# Patient Record
Sex: FEMALE | Race: WHITE | Employment: UNEMPLOYED | URBAN - METROPOLITAN AREA
[De-identification: names, ages, dates, MRNs, and addresses within clinical notes are randomized per-mention and may not be internally consistent; named-entity substitution may affect disease eponyms.]

---

## 2020-05-06 ENCOUNTER — VIRTUAL VISIT (OUTPATIENT)
Dept: HEMATOLOGY | Age: 40
End: 2020-05-06

## 2020-05-06 DIAGNOSIS — D13.4 HEPATIC ADENOMA: ICD-10-CM

## 2020-05-06 NOTE — PROGRESS NOTES
[address] [facility], Seema MUSE, Aide Cee, MD Nora Allison, PASharonC    Juan Manuel Loaiza, [facility]     Sapphire MCCLOUD Dinora, [facility]   Izora Hashimoto, FNP-BECK Bravobernie Inez, [facility]       Jarad Deputado [address] 136    at [address], [address] [place] [address], [facility] 22.    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  ICD-9-CM: 759.89  5/16/2020        Hepatic adenoma ICD-10-CM: D13.4  ICD-9-CM: 211.5  5/16/2020    Overview Signed 5/16/2020  8:48 AM by Belgica Dougherty MD     Resected 10/2019             Hypercholesteremia ICD-10-CM: E78.00  ICD-9-CM: 272.0  5/16/2020        Depression ICD-10-CM: F32.9  ICD-9-CM: 534  5/16/2020        Hypertension ICD-10-CM: I10  ICD-9-CM: 401.9  5/16/2020        S/P DAVIDE (total abdominal hysterectomy) ICD-10-CM: Z90.710  ICD-9-CM: V88.01  5/16/2020              VIRTUAL TELEHEALTH VISIT PERFORMED DUE TO COVID-19 EPIDEMIC    CONSENT:  Adriana Nicholson, who was seen by synchronous, real-time, audio-video technology, and/or her healthcare decision maker, is aware that this patient-initiated, Telehealth encounter on 5/6/2020 is a billable service, with coverage as determined by her insurance carrier. She is aware that she may receive a bill and has provided verbal consent to proceed. This patient was evaluated during a Virtual Telehealth visit. A caregiver was present if appropriate.  Due to this being a TeleHealth encounter performed during the [address] health emergency, the physical examination was limited to that listed in the [facility].    The clinicians listed above have asked me to see Germaine sIaacs in consultation regarding elevated liver enzymes and its management. No medical records were available for review when the patient was here for the appointment. The patient is a 44 y.o. 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 She has a suster who underwent a liver transplant in [address] [place] [address], for the same disorder. She was found to have a hepatic adenoma which was surgically removed in 10/2019. Imaging of the liver was not recently performed     An assessment of liver fibrosis with biopsy or elastography has not been performed. The patient has the following symptoms which are thought to be due to the liver disease:  nausea, diarrhea,     The patient is not currently experiencing the following symptoms of liver disease:  fatigue, yellowing of the eyes or skin, itching,     The patient completes all daily activities without any functional limitations. 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 Some patients may have a very form of the disease with very slow liver injury which does not affect life expectancy. This appears to be the case in this patient. Will perform laboratory testing to monitor liver function and degree of liver injury. This included BMP, hepatic panel, CBC with platelet count,     The need to perform an assessment of liver fibrosis was discussed with the patient. The Fibroscan can assess liver fibrosis and determine if a patient has advanced fibrosis or cirrhosis without the need for liver biopsy. This will be performed at the next office visit. The Fibroscan can be repeated annually or as often as clinically indicated to assess for fibrosis progression and/or regression.     Will perform imaging of the liver with MRI and MRCP. There is no reason to perform liver biopsy     Screening for Hepatocellular Carcinoma  HCC screening is not necessary if the patient has no evidence of cirrhosis. Treatment of other medical problems in patients with chronic liver disease  There are no contraindications for the patient to take most medications that are necessary for treatment of other medical issues. Counseling for alcohol in patients with chronic liver disease  The patient was counseled regarding alcohol consumption and the effect of alcohol on chronic liver disease. The patient does not consume any significant amount of alcohol. Vaccinations   The need for vaccination against viral hepatitis A and B will be assessed with serologic and instituted as appropriate. Routine vaccinations against other bacterial and viral agents can be performed as indicated. Annual flu vaccination should be administered if indicated. ALLERGIES  Allergies not on file    MEDICATIONS  No current outpatient medications on file. No current facility-administered medications for this visit. SYSTEM REVIEW NOT RELATED TO LIVER DISEASE OR REVIEWED ABOVE:  Constitution systems: Negative for fever, chills, weight gain, weight loss. Eyes: Negative for visual changes. ENT: Negative for sore throat, painful swallowing. Respiratory: Negative for cough, hemoptysis, SOB. Cardiology: Negative for chest pain, palpitations. GI:  Negative for constipation or diarrhea. : Negative for urinary frequency, dysuria, hematuria, nocturia. Skin: Negative for rash. Hematology: Negative for easy bruising, blood clots. Musculo-skelatal: Negative for back pain, muscle pain, weakness. Neurologic: Negative for headaches, dizziness, vertigo, memory problems not related to HE. Psychology: Negative for anxiety, depression. FAMILY HISTORY:  The father Has/had the following following chronic disease(s): DM, CHF.     The mother Has/had the following chronic disease(s): CKD, HTN. A sister also has Alagille syndrome. SOCIAL HISTORY:  The patient is . The patient has 1 child. The patient has never used tobacco products. The patient has never consumed significant amounts of alcohol. 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 PHYSICAL EXAMINATION PERFORMED BY VIRTUAL TELEHEALTH:  VS: Not performed   General: No acute distress. Eyes: Sclera anicteric. ENT: No oral lesions. Skin: No rashes. spider angiomata. No jaundice. Abdomen: No obvious distention suggesting ascites. Extremities: No edema. No muscle wasting. Neurologic: Alert and oriented. Cranial nerves grossly intact. LABORATORY STUDIES:  Recent liver function panel, CBC with platelet count and BMP are not available. These studies will be performed. SEROLOGIES:  Not available or performed. Testing was performed today. LIVER HISTOLOGY:  Not available or performed    ENDOSCOPIC PROCEDURES:  Not available or performed    RADIOLOGY:  Not available or performed    OTHER TESTING:  Not available or performed    FOLLOW-UP:  Pursuant to the emergency declaration under the [address] [place] [address] authority and the [employer] Act, this Virtual  Visit was conducted, with the patient's (and/or their legal guardian's) consent, to reduce the patient's risk of exposure to COVID-19 and provide necessary medical care. Services were provided through a video synchronous discussion virtually to substitute for an in-person clinic visit. The patient was located in their home. The provider was located in the [address].        Because of the COVID-19 epidemic all non-emergent diagnostic testing and the in-office visit will be delayed by several months to reduce the risk of patient exposure to and potential infection from the novel corona virus. This follow-up appointment may be delayed further if warranted by the status of the epidemic at that time. Orders to obtain laboratory testing will be mailed to the patient and obtained 1 week prior to the in-person appointment.     Follow-up Lewis Carnes 32 in 1 months for 305 Neyda Medina MD  [address] [address], [address]  [address] [place] [address]

## 2020-05-16 PROBLEM — Z90.710 S/P TAH (TOTAL ABDOMINAL HYSTERECTOMY): Status: ACTIVE | Noted: 2020-05-16

## 2020-05-16 PROBLEM — Q44.7 ALAGILLE SYNDROME: Status: ACTIVE | Noted: 2020-05-16

## 2020-05-16 PROBLEM — D13.4 HEPATIC ADENOMA: Status: ACTIVE | Noted: 2020-05-16

## 2020-05-16 PROBLEM — F32.A DEPRESSION: Status: ACTIVE | Noted: 2020-05-16

## 2020-05-16 PROBLEM — E78.00 HYPERCHOLESTEREMIA: Status: ACTIVE | Noted: 2020-05-16

## 2020-05-16 PROBLEM — I10 HYPERTENSION: Status: ACTIVE | Noted: 2020-05-16

## 2020-09-22 ENCOUNTER — OFFICE VISIT (OUTPATIENT)
Dept: HEMATOLOGY | Age: 40
End: 2020-09-22
Payer: OTHER GOVERNMENT

## 2020-09-22 VITALS
BODY MASS INDEX: 30.96 KG/M2 | HEIGHT: 61 IN | WEIGHT: 164 LBS | OXYGEN SATURATION: 98 % | TEMPERATURE: 99.7 F | HEART RATE: 67 BPM | SYSTOLIC BLOOD PRESSURE: 125 MMHG | DIASTOLIC BLOOD PRESSURE: 55 MMHG

## 2020-09-22 DIAGNOSIS — R16.0 LIVER MASS: Primary | ICD-10-CM

## 2020-09-22 DIAGNOSIS — R16.0 LIVER MASS: ICD-10-CM

## 2020-09-22 DIAGNOSIS — Q44.7 ALAGILLE SYNDROME: ICD-10-CM

## 2020-09-22 PROCEDURE — 99214 OFFICE O/P EST MOD 30 MIN: CPT | Performed by: INTERNAL MEDICINE

## 2020-09-22 PROCEDURE — 91200 LIVER ELASTOGRAPHY: CPT | Performed by: INTERNAL MEDICINE

## 2020-09-22 RX ORDER — LISINOPRIL 5 MG/1
TABLET ORAL
COMMUNITY
Start: 2020-08-31

## 2020-09-22 RX ORDER — ESCITALOPRAM OXALATE 20 MG/1
TABLET ORAL
COMMUNITY
Start: 2020-09-13

## 2020-09-22 RX ORDER — ACETAMINOPHEN 500 MG
TABLET ORAL 2 TIMES DAILY
COMMUNITY

## 2020-09-22 RX ORDER — ASPIRIN 81 MG/1
TABLET ORAL DAILY
COMMUNITY

## 2020-09-22 RX ORDER — ONDANSETRON 4 MG/1
TABLET, FILM COATED ORAL
COMMUNITY
Start: 2020-09-13

## 2020-09-22 RX ORDER — RIZATRIPTAN BENZOATE 10 MG/1
TABLET ORAL
COMMUNITY
Start: 2020-09-13 | End: 2022-04-26

## 2020-09-22 NOTE — PROGRESS NOTES
3340 \[facility]\"", Vijaya MUSE, MD Danny Kaplan, PAJUAN Vaughan, ACNP-BC     Sapphire MCCLOUD Dinora, [facility]   GLO RuizP-BECK Hartman, [facility]       Jarad Berman [facility] [address] 136    at [address], [address] [place] [address], [facility] 22.    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  ICD-9-CM: 759.89  5/16/2020        Hepatic adenoma ICD-10-CM: D13.4  ICD-9-CM: 211.5  5/16/2020    Overview Signed 5/16/2020  8:48 AM by Lio Fry MD     Resected 10/2019             Hypercholesteremia ICD-10-CM: E78.00  ICD-9-CM: 272.0  5/16/2020        Depression ICD-10-CM: F32.9  ICD-9-CM: 579  5/16/2020        Hypertension ICD-10-CM: I10  ICD-9-CM: 401.9  5/16/2020        S/P DAVIDE (total abdominal hysterectomy) ICD-10-CM: Z90.710  ICD-9-CM: V88.01  5/16/2020              Abril Bennett is being seen at [address] for management of elevated liver enzymes thought to be related to Alagille syndrome and a hepatic adenoma. The active problem list, all pertinent past medical history, medications, and laboratory findings related to the liver disorder were reviewed with the patient. She was found to have a hepatic adenoma which was surgically removed in 10/2019. Imaging of the liver was not recently performed     Assessment of liver fibrosis with Fibroscan was performed in the office today.   The result was 7.5 kPa which correlates with stage 1 portal fibrosis. The CAP score of 315 suggests fatty liver. The patient has the following symptoms which are thought to be due to the liver disease:  nausea, diarrhea,     The patient is not currently experiencing the following symptoms of liver disease:  fatigue, yellowing of the eyes or skin, itching,     The patient completes all daily activities without any functional limitations. 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 Some patients may have a very mild form of the disease with very slow liver injury which does not affect life expectancy. This appears to be the case in this patient. Fibroscan performed in 9/2020 suggests stage 1 portal fibrosis and fatty liver. The Fibroscan can be repeated annually or as often as clinically indicated to assess for fibrosis progression and/or regression. Will perform imaging of the liver with MRI and MRCP. There is no reason to perform liver biopsy     Hepatic adenoma  The patient was found to have a liver mass   This was adenoma and surgically in 2019. Will perform MRI to make sure there are no other liver mass lesions/adenomas we need to be aware of moving forward. Screening for Hepatocellular Carcinoma  HCC screening is not necessary if the patient has no evidence of cirrhosis. Treatment of other medical problems in patients with chronic liver disease  There are no contraindications for the patient to take most medications that are necessary for treatment of other medical issues. Counseling for alcohol in patients with chronic liver disease  The patient was counseled regarding alcohol consumption and the effect of alcohol on chronic liver disease. The patient does not consume any significant amount of alcohol.     Vaccinations   The need for vaccination against viral hepatitis A and B will be assessed with serologic and instituted as appropriate. Routine vaccinations against other bacterial and viral agents can be performed as indicated. Annual flu vaccination should be administered if indicated. ALLERGIES  Allergies not on file    MEDICATIONS  Current Outpatient Medications   Medication Sig    escitalopram oxalate (LEXAPRO) 20 mg tablet     lisinopriL (PRINIVIL, ZESTRIL) 5 mg tablet     ondansetron hcl (ZOFRAN) 4 mg tablet     rizatriptan (MAXALT) 10 mg tablet     aspirin delayed-release 81 mg tablet Take  by mouth daily.  cholecalciferol (VITAMIN D3) (2,000 UNITS /50 MCG) cap capsule Take  by mouth two (2) times a day. No current facility-administered medications for this visit. SYSTEM REVIEW NOT RELATED TO LIVER DISEASE OR REVIEWED ABOVE:  Constitution systems: Negative for fever, chills, weight gain, weight loss. Eyes: Negative for visual changes. ENT: Negative for sore throat, painful swallowing. Respiratory: Negative for cough, hemoptysis, SOB. Cardiology: Negative for chest pain, palpitations. GI:  Negative for constipation or diarrhea. : Negative for urinary frequency, dysuria, hematuria, nocturia. Skin: Negative for rash. Hematology: Negative for easy bruising, blood clots. Musculo-skelatal: Negative for back pain, muscle pain, weakness. Neurologic: Negative for headaches, dizziness, vertigo, memory problems not related to HE. Psychology: Negative for anxiety, depression. FAMILY HISTORY:  The father Has/had the following following chronic disease(s): DM, CHF. The mother Has/had the following chronic disease(s): CKD, HTN. A sister also has Alagille syndrome. SOCIAL HISTORY:  The patient is . The patient has 1 child. The patient has never used tobacco products. The patient has never consumed significant amounts of alcohol. 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        PHYSICAL EXAMINATION PERFORMED BY VIRTUAL TELEHEALTH:  VS: Not performed   General: No acute distress. Eyes: Sclera anicteric. ENT: No oral lesions. Skin: No rashes. spider angiomata. No jaundice. Abdomen: No obvious distention suggesting ascites. Extremities: No edema. No muscle wasting. Neurologic: Alert and oriented. Cranial nerves grossly intact. LABORATORY STUDIES:  From 9/2020  AST/ALT/ALP/T Bili/ALB:  42/45/121/0.5/4.2  WBC/HB/PLT/INR:  NA/BUN/CREAT:  142/12/0.82    SEROLOGIES:  Not available or performed. Testing was performed today. LIVER HISTOLOGY:  9/2020. FibroScan performed at [address]. EkPa was 7.5. IQR/med 25%. . The results suggested a fibrosis level of F1. The CAP score suggests fatty liver    ENDOSCOPIC PROCEDURES:  Not available or performed    RADIOLOGY:  Not available or performed    OTHER TESTING:  Not available or performed    FOLLOW-UP:  All of the issues listed above in the Assessment and Plan were discussed with the patient. All questions were answered. The patient expressed a clear understanding of the above. [address] [address] in 4 weeks to review MRI scan and determine the treatment plan.       Rito Dos Santos MD  [address]  4 [facility], [address] [place] [address] Rajeev, [address]  12 [facility]

## 2020-10-07 DIAGNOSIS — R16.0 LIVER MASS: ICD-10-CM

## 2020-10-07 DIAGNOSIS — R16.0 LIVER MASS: Primary | ICD-10-CM

## 2020-10-15 ENCOUNTER — HOSPITAL ENCOUNTER (OUTPATIENT)
Dept: MRI IMAGING | Age: 40
Discharge: HOME | End: 2020-10-15
Payer: OTHER GOVERNMENT

## 2020-10-15 VITALS — WEIGHT: 160 LBS | BODY MASS INDEX: 30.23 KG/M2

## 2020-10-15 LAB — CREAT UR-MCNC: 1 MG/DL (ref 0.6–1.3)

## 2020-10-15 PROCEDURE — A9575 INJ GADOTERATE MEGLUMI 0.1ML: HCPCS

## 2020-10-15 PROCEDURE — 74011250636 HC RX REV CODE- 250/636

## 2020-10-15 PROCEDURE — 82565 ASSAY OF CREATININE: CPT

## 2020-10-15 PROCEDURE — 74183 MRI ABD W/O CNTR FLWD CNTR: CPT

## 2020-10-15 RX ORDER — GADOTERATE MEGLUMINE 376.9 MG/ML
15 INJECTION INTRAVENOUS
Status: COMPLETED | OUTPATIENT
Start: 2020-10-15 | End: 2020-10-15

## 2020-10-15 RX ADMIN — GADOTERATE MEGLUMINE 15 ML: 376.9 INJECTION INTRAVENOUS at 13:18

## 2020-10-28 ENCOUNTER — VIRTUAL VISIT (OUTPATIENT)
Dept: HEMATOLOGY | Age: 40
End: 2020-10-28
Payer: OTHER GOVERNMENT

## 2020-10-28 DIAGNOSIS — D13.4 HEPATIC ADENOMA: Primary | ICD-10-CM

## 2020-10-28 PROCEDURE — 99214 OFFICE O/P EST MOD 30 MIN: CPT | Performed by: INTERNAL MEDICINE

## 2020-10-28 NOTE — PROGRESS NOTES
3340 [facility], MD, MD Glenn Abdalla PA-C Tor Guiles, [facility]-BC     Sapphrie MCCLOUD Dinora, [facility]   EDITH Santiago-BECK [facility]       Jarad Berman [facility] 136    at [address], [facility] Alesia Saldana  22.    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  ICD-9-CM: 759.89  5/16/2020        Hepatic adenoma ICD-10-CM: D13.4  ICD-9-CM: 211.5  5/16/2020    Overview Signed 5/16/2020  8:48 AM by Rhonda Enciso MD     Resected 10/2019             Hypercholesteremia ICD-10-CM: E78.00  ICD-9-CM: 272.0  5/16/2020        Depression ICD-10-CM: F32.9  ICD-9-CM: 444  5/16/2020        Hypertension ICD-10-CM: I10  ICD-9-CM: 401.9  5/16/2020        S/P DAVIDE (total abdominal hysterectomy) ICD-10-CM: Z90.710  ICD-9-CM: V88.01  5/16/2020              [facility] for management of elevated liver enzymes thought to be related to Alagille syndrome and a hepatic adenoma. The active problem list, all pertinent past medical history, medications, and laboratory findings related to the liver disorder were reviewed with the patient. She was found to have a hepatic adenoma which was surgically removed in 10/2019. The most recent imaging of the liver was MRI performed in 10/2020.   Results suggest fatty liver disease and a liver mass most consistent with adenoma measuring 2 x 1.5 cm in the right lobe, segment 6. Assessment of liver fibrosis with Fibroscan was performed in 9/2020. The result was 7.5 kPa which correlates with stage 1 portal fibrosis. The CAP score of 315 suggests fatty liver. The patient has the following symptoms which are thought to be due to the liver disease:  nausea, diarrhea,     The patient is not currently experiencing the following symptoms of liver disease:  fatigue, yellowing of the eyes or skin, itching,     The patient completes all daily activities without any functional limitations. 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 Some patients may have a very mild form of the disease with very slow liver injury which does not affect life expectancy. This appears to be the case in this patient. Fibroscan performed in 9/2020 suggests stage 1 portal fibrosis and fatty liver. The Fibroscan can be repeated annually or as often as clinically indicated to assess for fibrosis progression and/or regression. Will perform imaging of the liver with MRI and MRCP. There is no reason to perform liver biopsy     Hepatic adenoma  The patient was found to have hepatic adenoma 2019. MRI in 10/2020 demonstrated a liver mass consistent with adenoma in the right lobe, segment 6. Will repeat MRI in 3 months to make sure there is no change in size. Treatment of other medical problems in patients with chronic liver disease  There are no contraindications for the patient to take most medications that are necessary for treatment of other medical issues. Counseling for alcohol in patients with chronic liver disease  The patient was counseled regarding alcohol consumption and the effect of alcohol on chronic liver disease. The patient does not consume any significant amount of alcohol.     Vaccinations   The need for vaccination against viral hepatitis A and B will be assessed with serologic and instituted as appropriate. Routine vaccinations against other bacterial and viral agents can be performed as indicated. Annual flu vaccination should be administered if indicated. ALLERGIES  Allergies not on file    MEDICATIONS  Current Outpatient Medications   Medication Sig    escitalopram oxalate (LEXAPRO) 20 mg tablet     lisinopriL (PRINIVIL, ZESTRIL) 5 mg tablet     ondansetron hcl (ZOFRAN) 4 mg tablet     rizatriptan (MAXALT) 10 mg tablet     aspirin delayed-release 81 mg tablet Take  by mouth daily.  cholecalciferol (VITAMIN D3) (2,000 UNITS /50 MCG) cap capsule Take  by mouth two (2) times a day. No current facility-administered medications for this visit. SYSTEM REVIEW NOT RELATED TO LIVER DISEASE OR REVIEWED ABOVE:  Constitution systems: Negative for fever, chills, weight gain, weight loss. Eyes: Negative for visual changes. ENT: Negative for sore throat, painful swallowing. Respiratory: Negative for cough, hemoptysis, SOB. Cardiology: Negative for chest pain, palpitations. GI:  Negative for constipation or diarrhea. : Negative for urinary frequency, dysuria, hematuria, nocturia. Skin: Negative for rash. Hematology: Negative for easy bruising, blood clots. Musculo-skelatal: Negative for back pain, muscle pain, weakness. Neurologic: Negative for headaches, dizziness, vertigo, memory problems not related to HE. Psychology: Negative for anxiety, depression. FAMILY HISTORY:  The father Has/had the following following chronic disease(s): DM, CHF. The mother Has/had the following chronic disease(s): CKD, HTN. A sister also has Alagille syndrome. SOCIAL HISTORY:  The patient is . The patient has 1 child. The patient has never used tobacco products. The patient has never consumed significant amounts of alcohol. 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        PHYSICAL EXAMINATION PERFORMED BY VIRTUAL TELEHEALTH:  VS: Not performed   General: No acute distress. Eyes: Sclera anicteric. ENT: No oral lesions. Skin: No rashes. spider angiomata. No jaundice. Abdomen: No obvious distention suggesting ascites. Extremities: No edema. No muscle wasting. Neurologic: Alert and oriented. Cranial nerves grossly intact. LABORATORY STUDIES:  From 9/2020  AST/ALT/ALP/T Bili/ALB:  42/45/121/0.5/4.2  WBC/HB/PLT/INR:    NA/BUN/CREAT:  142/12/0.82    SEROLOGIES:  Not available or performed. Testing was performed today. LIVER HISTOLOGY:  9/2020. Fib[facility]Scan performed at [facility]. EkPa was 7.5. IQR/med 25%. . The results suggested a fibrosis level of F1. The CAP score suggests fatty liver    ENDOSCOPIC PROCEDURES:  Not available or performed    RADIOLOGY:  10/2020. Dynamic MRI of liver. Enhancing liver mass with washout and no rim enhancement on delayed images in the right lobe measuring 2.0 x 1.5 cm. No central scar. Most consistent with adenoma     OTHER TESTING:  Not available or performed    FOLLOW-UP AFTER VIRTUAL VISIT:  Pursuant to the emergency declaration under the [facility], [facility] waiver authority and the [employer] Act, this Virtual  Visit was conducted, with the patient's (and/or their legal guardian's) consent, to reduce the patient's risk of exposure to COVID-19 and provide necessary medical care. Services were provided through a video synchronous discussion virtually to substitute for an in-person clinic visit. The patient was located in their home. The provider [facility] the [facility]. All of the issues listed above in the Assessment and Plan were discussed with the patient. All questions were answered. The patient expressed a clear understanding of the above.     Because of the COVID-19 epidemic a follow-up appointment will be performed via TeleHealth in 3 months which should be 1-2 weeks after the next imaging study.         Vipul Morillo MD  [address]  4 [facility], [address] [place] [address] Neli Layton, [address]  12 [facility]

## 2021-02-01 DIAGNOSIS — D13.4 HEPATIC ADENOMA: ICD-10-CM

## 2022-02-11 ENCOUNTER — TRANSCRIBE ORDER (OUTPATIENT)
Dept: SCHEDULING | Age: 42
End: 2022-02-11

## 2022-02-11 DIAGNOSIS — R74.8 ABNORMAL LIVER ENZYMES: Primary | ICD-10-CM

## 2022-02-25 ENCOUNTER — HOSPITAL ENCOUNTER (OUTPATIENT)
Dept: MRI IMAGING | Age: 42
Discharge: HOME | End: 2022-02-25
Attending: FAMILY MEDICINE
Payer: OTHER GOVERNMENT

## 2022-02-25 DIAGNOSIS — R74.8 ABNORMAL LIVER ENZYMES: ICD-10-CM

## 2022-02-25 LAB — CREAT UR-MCNC: 0.8 MG/DL (ref 0.6–1.3)

## 2022-02-25 PROCEDURE — A9575 INJ GADOTERATE MEGLUMI 0.1ML: HCPCS

## 2022-02-25 PROCEDURE — 74183 MRI ABD W/O CNTR FLWD CNTR: CPT

## 2022-02-25 PROCEDURE — 82565 ASSAY OF CREATININE: CPT

## 2022-02-25 PROCEDURE — 74011250636 HC RX REV CODE- 250/636

## 2022-02-25 RX ORDER — GADOTERATE MEGLUMINE 376.9 MG/ML
15 INJECTION INTRAVENOUS
Status: COMPLETED | OUTPATIENT
Start: 2022-02-25 | End: 2022-02-25

## 2022-02-25 RX ADMIN — GADOTERATE MEGLUMINE 15 ML: 376.9 INJECTION INTRAVENOUS at 19:20

## 2022-04-04 ENCOUNTER — OFFICE VISIT (OUTPATIENT)
Dept: HEMATOLOGY | Age: 42
End: 2022-04-04
Payer: OTHER GOVERNMENT

## 2022-04-04 ENCOUNTER — HOSPITAL ENCOUNTER (OUTPATIENT)
Dept: LAB | Age: 42
Discharge: HOME | End: 2022-04-04
Payer: OTHER GOVERNMENT

## 2022-04-04 VITALS
SYSTOLIC BLOOD PRESSURE: 143 MMHG | TEMPERATURE: 97.5 F | BODY MASS INDEX: 30.61 KG/M2 | WEIGHT: 162 LBS | OXYGEN SATURATION: 98 % | HEART RATE: 64 BPM | DIASTOLIC BLOOD PRESSURE: 51 MMHG

## 2022-04-04 DIAGNOSIS — Q44.7 ALAGILLE SYNDROME: ICD-10-CM

## 2022-04-04 DIAGNOSIS — Q44.7 ALAGILLE SYNDROME: Primary | ICD-10-CM

## 2022-04-04 LAB
ALBUMIN SERPL-MCNC: 3.1 G/DL (ref 3.4–5)
ALBUMIN/GLOB SERPL: 1.1 {RATIO} (ref 0.8–1.7)
ALP SERPL-CCNC: 136 U/L (ref 45–117)
ALT SERPL-CCNC: 42 U/L (ref 13–56)
ANION GAP SERPL CALC-SCNC: 2 MMOL/L (ref 3–18)
AST SERPL-CCNC: 24 U/L (ref 10–38)
BASOPHILS # BLD: 0.1 K/UL (ref 0–0.1)
BASOPHILS NFR BLD: 1 % (ref 0–2)
BILIRUB DIRECT SERPL-MCNC: <0.1 MG/DL (ref 0–0.2)
BILIRUB SERPL-MCNC: 0.2 MG/DL (ref 0.2–1)
BUN SERPL-MCNC: 15 MG/DL (ref 7–18)
BUN/CREAT SERPL: 17 (ref 12–20)
CALCIUM SERPL-MCNC: 8.2 MG/DL (ref 8.5–10.1)
CHLORIDE SERPL-SCNC: 112 MMOL/L (ref 100–111)
CO2 SERPL-SCNC: 29 MMOL/L (ref 21–32)
CREAT SERPL-MCNC: 0.88 MG/DL (ref 0.6–1.3)
DIFFERENTIAL METHOD BLD: ABNORMAL
EOSINOPHIL # BLD: 0.3 K/UL (ref 0–0.4)
EOSINOPHIL NFR BLD: 3 % (ref 0–5)
ERYTHROCYTE [DISTWIDTH] IN BLOOD BY AUTOMATED COUNT: 13 % (ref 11.6–14.5)
GLOBULIN SER CALC-MCNC: 2.8 G/DL (ref 2–4)
GLUCOSE SERPL-MCNC: 105 MG/DL (ref 74–99)
HCT VFR BLD AUTO: 35.6 % (ref 35–45)
HGB BLD-MCNC: 11.3 G/DL (ref 12–16)
IMM GRANULOCYTES # BLD AUTO: 0.6 K/UL (ref 0–0.04)
IMM GRANULOCYTES NFR BLD AUTO: 5 % (ref 0–0.5)
INR PPP: 1.1 (ref 0.8–1.2)
LYMPHOCYTES # BLD: 2.6 K/UL (ref 0.9–3.6)
LYMPHOCYTES NFR BLD: 23 % (ref 21–52)
MCH RBC QN AUTO: 26.8 PG (ref 24–34)
MCHC RBC AUTO-ENTMCNC: 31.7 G/DL (ref 31–37)
MCV RBC AUTO: 84.4 FL (ref 78–100)
MONOCYTES # BLD: 0.7 K/UL (ref 0.05–1.2)
MONOCYTES NFR BLD: 6 % (ref 3–10)
NEUTS SEG # BLD: 6.9 K/UL (ref 1.8–8)
NEUTS SEG NFR BLD: 62 % (ref 40–73)
NRBC # BLD: 0 K/UL (ref 0–0.01)
NRBC BLD-RTO: 0 PER 100 WBC
PLATELET # BLD AUTO: 229 K/UL (ref 135–420)
PMV BLD AUTO: 9.8 FL (ref 9.2–11.8)
POTASSIUM SERPL-SCNC: 3.4 MMOL/L (ref 3.5–5.5)
PROT SERPL-MCNC: 5.9 G/DL (ref 6.4–8.2)
PROTHROMBIN TIME: 13.3 SEC (ref 11.5–15.2)
RBC # BLD AUTO: 4.22 M/UL (ref 4.2–5.3)
SODIUM SERPL-SCNC: 143 MMOL/L (ref 136–145)
WBC # BLD AUTO: 11.1 K/UL (ref 4.6–13.2)

## 2022-04-04 PROCEDURE — 80048 BASIC METABOLIC PNL TOTAL CA: CPT

## 2022-04-04 PROCEDURE — 99213 OFFICE O/P EST LOW 20 MIN: CPT | Performed by: INTERNAL MEDICINE

## 2022-04-04 PROCEDURE — 85610 PROTHROMBIN TIME: CPT

## 2022-04-04 PROCEDURE — 36415 COLL VENOUS BLD VENIPUNCTURE: CPT

## 2022-04-04 PROCEDURE — 80076 HEPATIC FUNCTION PANEL: CPT

## 2022-04-04 PROCEDURE — 85025 COMPLETE CBC W/AUTO DIFF WBC: CPT

## 2022-04-04 RX ORDER — OMEPRAZOLE 20 MG/1
CAPSULE, DELAYED RELEASE ORAL DAILY
COMMUNITY

## 2022-04-04 RX ORDER — ATORVASTATIN CALCIUM 40 MG/1
TABLET, FILM COATED ORAL DAILY
COMMUNITY

## 2022-04-04 RX ORDER — AMITRIPTYLINE HYDROCHLORIDE 10 MG/1
TABLET, FILM COATED ORAL
COMMUNITY

## 2022-04-04 NOTE — Clinical Note
4/26/2022    Patient: Jannie Lamar   YOB: 1980   Date of Visit: 4/4/2022     Jaimee Reid MD  [address]  [address] [place] [address]  Via Fax: [phone], MD  005 [facility],  [phone]  Via Outside Provider Messaging    Dear Lasandra Dace, MD Mendel Mass, MD,      Thank you for referring Ms. Abril Bennett to [address] [place] [address],[address] for evaluation. My notes for this consultation are attached. If you have questions, please do not hesitate to call me. I look forward to following your patient along with you.       Sincerely,    Jonel Kehr, MD

## 2022-04-04 NOTE — PROGRESS NOTES
[facility], Rj MUSE, Vernon Ferguson MD Laquetta Campanile, PAJUAN Hinkle, [facility]     Sapphire Farias, [facility]   Laura Mcdaniels FNP-BECK Lawson, [facility]       Jarad Berman B[address]    at [address], [facility] Alesia Saldana  22.    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 The active problem list, all pertinent past medical history, medications, and laboratory findings related to the liver disorder were reviewed with the patient. She was found to have a hepatic adenoma in the left lobe which was surgically removed in 10/2019. The most recent imaging of the liver was MRI performed in 10/2020.   Results suggest fatty liver disease and a liver mass most consistent with adenoma measuring 2 x 1.5 cm in the right lobe, segment 6. Fibroscan performed in 9/2020 was 7.5 kPa which correlates with stage 1 portal fibrosis. The CAP score of 315 suggests fatty liver. The patient has the following symptoms which are thought to be due to the liver disease:  nausea, diarrhea,     The patient is not currently experiencing the following symptoms of liver disease:  fatigue, yellowing of the eyes or skin, itching,     The patient completes all daily activities without any functional limitations. 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 Some patients may have a very mild form of the disease with very slow liver injury which does not affect life expectancy. This appears to be the case in this patient. Fibroscan performed in 9/2020 suggests stage 1 portal fibrosis and fatty liver. The Fibroscan can be repeated annually or as often as clinically indicated to assess for fibrosis progression and/or regression. Have performed laboratory testing to monitor liver function and degree of liver injury. This included BMP, hepatic panel, CBC with platelet count,   Liver transaminases are normal.  ALP is elevated but stable in the 170 range. Liver function is normal.  The platelet count is normal.      Based upon laboratory studies, Fibroscan and imaging the patient does not appear to have advanced liver disease. There is no reason to perform liver biopsy     Hepatic adenoma  The patient was found to have hepatic adenoma 2019. The most recent MRI in 2/2022 demonstrates there is no change in the size of the segment 6 adenoma dating back to 2020 which measures 1.8 x 1.2 cm. There has been resection of the left lobe.     Treatment of other medical problems in patients with chronic liver disease  There are no contraindications for the patient to take most medications that are necessary for treatment of other medical issues. Counseling for alcohol in patients with chronic liver disease  The patient was counseled regarding alcohol consumption and the effect of alcohol on chronic liver disease. The patient does not consume any significant amount of alcohol. Vaccinations   The need for vaccination against viral hepatitis A and B will be assessed with serologic and instituted as appropriate. Routine vaccinations against other bacterial and viral agents can be performed as indicated. Annual flu vaccination should be administered if indicated. ALLERGIES  Not on File    MEDICATIONS  Current Outpatient Medications   Medication Sig    atorvastatin (LIPITOR) 40 mg tablet Take  by mouth daily.  omeprazole (PRILOSEC) 20 mg capsule Take  by mouth daily.  amitriptyline (ELAVIL) 10 mg tablet Take  by mouth nightly.  escitalopram oxalate (LEXAPRO) 20 mg tablet     lisinopriL (PRINIVIL, ZESTRIL) 5 mg tablet     ondansetron hcl (ZOFRAN) 4 mg tablet     aspirin delayed-release 81 mg tablet Take  by mouth daily.  cholecalciferol (VITAMIN D3) (2,000 UNITS /50 MCG) cap capsule Take  by mouth two (2) times a day.  rizatriptan (MAXALT) 10 mg tablet  (Patient not taking: Reported on 4/4/2022)     No current facility-administered medications for this visit. SYSTEM REVIEW NOT RELATED TO LIVER DISEASE OR REVIEWED ABOVE:  Constitution systems: Negative for fever, chills, weight gain, weight loss. Eyes: Negative for visual changes. ENT: Negative for sore throat, painful swallowing. Respiratory: Negative for cough, hemoptysis, SOB. Cardiology: Negative for chest pain, palpitations. GI:  Negative for constipation or diarrhea. : Negative for urinary frequency, dysuria, hematuria, nocturia. Skin: Negative for rash. Hematology: Negative for easy bruising, blood clots. Musculo-skelatal: Negative for back pain, muscle pain, weakness.   Neurologic: Negative for headaches, dizziness, vertigo, memory problems not related to HE. Psychology: Negative for anxiety, depression. FAMILY HISTORY:  The father Has/had the following following chronic disease(s): DM, CHF. The mother Has/had the following chronic disease(s): CKD, HTN. A sister also has Alagille syndrome. SOCIAL HISTORY:  The patient is . The patient has 1 child. The patient has never used tobacco products. The patient has never consumed significant amounts of alcohol. 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 PHYSICAL EXAMINATION PERFORMED BY VIRTUAL TELEHEALTH:  VS: Not performed   General: No acute distress. Eyes: Sclera anicteric. ENT: No oral lesions. Skin: No rashes. spider angiomata. No jaundice. Abdomen: No obvious distention suggesting ascites. Extremities: No edema. No muscle wasting. Neurologic: Alert and oriented. Cranial nerves grossly intact. LABORATORY STUDIES:  Liver [place] [address] Units 4/4/2022   WBC 4.6 - 13.2 K/uL 11.1   ANC 1.8 - 8.0 K/UL 6.9   HGB 12.0 - 16.0 g/dL 11.3 (L)    - 420 K/uL 229   INR 0.8 - 1.2   1.1   AST 10 - 38 U/L 24   ALT 13 - 56 U/L 42   Alk Phos 45 - 117 U/L 136 (H)   Bili, Total 0.2 - 1.0 MG/DL 0.2   Bili, Direct 0.0 - 0.2 MG/DL <0.1   Albumin 3.4 - 5.0 g/dL 3.1 (L)   BUN 7.0 - 18 MG/DL 15   Creat 0.6 - 1.3 MG/DL 0.88   Creat (iSTAT) 0.6 - 1.3 MG/DL    Na 136 - 145 mmol/L 143   K 3.5 - 5.5 mmol/L 3.4 (L)   Cl 100 - 111 mmol/L 112 (H)   CO2 21 - 32 mmol/L 29   Glucose 74 - 99 mg/dL 105 (H)     SEROLOGIES:  Not available or performed. Testing was performed today. LIVER HISTOLOGY:  9/2020. FibroScan performed at [address]. EkPa was 7.5. IQR/med 25%. . The results suggested a fibrosis level of F1. The CAP score suggests fatty liver    ENDOSCOPIC PROCEDURES:  Not available or performed    RADIOLOGY:  10/2020. Dynamic MRI of liver.   Enhancing liver mass with washout and no rim enhancement on delayed images in the right lobe measuring 2.0 x 1.5 cm. No central scar. Most consistent with adenoma     OTHER TESTING:  Not available or performed    FOLLOW-UP:  All of the issues listed above in the Assessment and Plan were discussed with the patient. All questions were answered. The patient expressed a clear understanding of the above. The patients  is being relocated to another  base in [facility]. I have given her the name of Dr Ruthie Seymour at [facility] for follow-up. We will provider her with these office notes prior to her departure for [place]. No follow-up at [address] is needed. I would be glad to see the patient back for follow-up at any time in the future if the clinical situation changes.       Brad Rocha MD  [id number] [facility]  [address], [address] [place] [address], [address]  12 Replaced by [facility]